# Patient Record
Sex: MALE | Race: OTHER | HISPANIC OR LATINO | Employment: UNEMPLOYED | URBAN - METROPOLITAN AREA
[De-identification: names, ages, dates, MRNs, and addresses within clinical notes are randomized per-mention and may not be internally consistent; named-entity substitution may affect disease eponyms.]

---

## 2022-11-07 ENCOUNTER — HOSPITAL ENCOUNTER (EMERGENCY)
Facility: HOSPITAL | Age: 32
Discharge: HOME | End: 2022-11-07
Attending: EMERGENCY MEDICINE

## 2022-11-07 VITALS
SYSTOLIC BLOOD PRESSURE: 137 MMHG | RESPIRATION RATE: 18 BRPM | HEART RATE: 104 BPM | DIASTOLIC BLOOD PRESSURE: 85 MMHG | WEIGHT: 182.32 LBS | TEMPERATURE: 98.1 F | OXYGEN SATURATION: 99 %

## 2022-11-07 DIAGNOSIS — Z76.0 MEDICATION REFILL: Primary | ICD-10-CM

## 2022-11-07 RX ORDER — BENZTROPINE MESYLATE 0.5 MG/1
0.5 TABLET ORAL 2 TIMES DAILY
Qty: 30 TABLET | Refills: 0 | Status: SHIPPED | OUTPATIENT
Start: 2022-11-07

## 2022-11-07 RX ORDER — QUETIAPINE FUMARATE 100 MG/1
100 TABLET, FILM COATED ORAL
Qty: 30 TABLET | Refills: 0 | Status: SHIPPED | OUTPATIENT
Start: 2022-11-07

## 2022-11-07 RX ORDER — PALIPERIDONE 9 MG/1
9 TABLET, EXTENDED RELEASE ORAL EVERY MORNING
Qty: 30 TABLET | Refills: 0 | Status: SHIPPED | OUTPATIENT
Start: 2022-11-07

## 2022-11-07 NOTE — ED PROVIDER NOTES
History  Chief Complaint   Patient presents with   • Medication Refill     Patient reports that he has not taken his psychiatric medications in 2 days for his schizophrenia (seroquel, cogentin, and 1 other medication)  Denies SI/HI and /  Reports he is waiting for his insurance to kick in       59-year-old male with a history of schizophrenia ran out of his meds 2 days ago, including Seroquel 100 milligrams q h s , Cogentin 0 5 milligrams daily and invega 9mg daily  He just moved to this area from [place] and is waiting for his insurance to go through in a few days  He has to change psychiatrist to this area  No SI/HI          None       Past Medical History:   Diagnosis Date   • Psychiatric disorder        History reviewed  No pertinent surgical history  History reviewed  No pertinent family history  I have reviewed and agree with the history as documented  E-Cigarette/Vaping     E-Cigarette/Vaping Substances     Social History     Tobacco Use   • Smoking status: Never Smoker   • Smokeless tobacco: Never Used   Substance Use Topics   • Alcohol use: Not Currently   • Drug use: Not Currently       Review of Systems   Constitutional: Negative for fever  Respiratory: Negative for shortness of breath  Cardiovascular: Negative for chest pain  Gastrointestinal: Negative for abdominal pain  Neurological: Negative for weakness  Psychiatric/Behavioral:        Mood normal, no SI/HI       Physical Exam  Physical Exam  Constitutional:       Appearance: He is well-developed  HENT:      Head: Normocephalic and atraumatic  Pulmonary:      Effort: Pulmonary effort is normal  No respiratory distress  Musculoskeletal:         General: Normal range of motion  Cervical back: Normal range of motion  Skin:     General: Skin is warm and dry  Neurological:      Mental Status: He is alert     Psychiatric:         Mood and Affect: Mood normal          Behavior: Behavior normal          Thought Content: Thought content normal          Judgment: Judgment normal       Comments: No SI/HI         Vital Signs  ED Triage Vitals   Temperature Pulse Respirations Blood Pressure SpO2   11/07/22 1611 11/07/22 1611 11/07/22 1611 11/07/22 1613 11/07/22 1611   98 1 °F (36 7 °C) 104 18 137/85 99 %      Temp Source Heart Rate Source Patient Position - Orthostatic VS BP Location FiO2 (%)   11/07/22 1611 11/07/22 1611 11/07/22 1611 11/07/22 1611 --   Oral Monitor Sitting Right arm       Pain Score       11/07/22 1611       No Pain           Vitals:    11/07/22 1611 11/07/22 1613   BP:  137/85   Pulse: 104    Patient Position - Orthostatic VS: Sitting          Visual Acuity      ED Medications  Medications - No data to display    Diagnostic Studies  Results Reviewed     None                 No orders to display              Procedures  Procedures         ED Course                                             MDM    Disposition  Final diagnoses:   Medication refill     Time reflects when diagnosis was documented in both MDM as applicable and the Disposition within this note     Time User Action Codes Description Comment    11/7/2022  4:53 PM Lashell Tobias Add [Z76 0] Medication refill       ED Disposition     ED Disposition   Discharge    Condition   Stable    Date/Time   Mon Nov 7, 2022  4:53 PM    [facility] discharge to home/self care                 Follow-up Information     Follow up With Specialties Details Why Contact Info Additional [address]   [address], [address] [facility] [address]  [address], [address], [address] [place], [place], [address] [facility] [place] [facility]   [address] [address]  [place] [address] [address], [facility], [facility], [place], [address], [phone]          Discharge Medication List as of 11/7/2022  5:00 PM      START taking these medications    Details   benztropine (COGENTIN) 0 5 mg tablet Take 1 tablet (0 5 mg total) by mouth 2 (two) times a day, Starting Mon 11/7/2022, Normal      paliperidone (INVEGA) 9 MG 24 hr tablet Take 1 tablet (9 mg total) by mouth every morning, Starting Mon 11/7/2022, Normal      QUEtiapine (SEROquel) 100 mg tablet Take 1 tablet (100 mg total) by mouth daily at bedtime, Starting Mon 11/7/2022, Normal             No discharge procedures on file      PDMP Review     None          ED Provider  Electronically Signed by           Whitley Smith MD  11/07/22 3942

## 2022-12-09 ENCOUNTER — HOSPITAL ENCOUNTER (EMERGENCY)
Facility: HOSPITAL | Age: 32
Discharge: HOME | End: 2022-12-09
Attending: EMERGENCY MEDICINE

## 2022-12-09 VITALS
HEART RATE: 107 BPM | OXYGEN SATURATION: 99 % | SYSTOLIC BLOOD PRESSURE: 146 MMHG | TEMPERATURE: 98.5 F | WEIGHT: 185.41 LBS | RESPIRATION RATE: 20 BRPM | DIASTOLIC BLOOD PRESSURE: 87 MMHG

## 2022-12-09 DIAGNOSIS — Z76.0 ENCOUNTER FOR MEDICATION REFILL: Primary | ICD-10-CM

## 2022-12-09 DIAGNOSIS — F20.9 SCHIZOPHRENIA (HCC): ICD-10-CM

## 2022-12-09 RX ORDER — BENZTROPINE MESYLATE 0.5 MG/1
0.5 TABLET ORAL 2 TIMES DAILY
Qty: 60 TABLET | Refills: 0 | Status: SHIPPED | OUTPATIENT
Start: 2022-12-09

## 2022-12-09 RX ORDER — PALIPERIDONE 9 MG/1
9 TABLET, EXTENDED RELEASE ORAL EVERY MORNING
Qty: 30 TABLET | Refills: 0 | Status: SHIPPED | OUTPATIENT
Start: 2022-12-09

## 2022-12-09 RX ORDER — QUETIAPINE FUMARATE 100 MG/1
100 TABLET, FILM COATED ORAL
Qty: 30 TABLET | Refills: 0 | Status: SHIPPED | OUTPATIENT
Start: 2022-12-09

## 2022-12-09 NOTE — ED PROVIDER NOTES
History  Chief Complaint   Patient presents with   • Vomiting     Vomiting since wednesday  Pt states he ran out of his psych medication and need refills  Pt states he does not have a PCP because of insurance of this time so he is unable to get refills  Pt states when he does not take his medications he is constantly vomiting and unable to eat and/or drink      33y M w/ schizophrenia, here for medication refill  Moved to [facility] from [place] a couple of months ago and still doesn't have a psychiatrist and is out of meds  When he's out of meds, he has problems w/ n/v and states he needs his meds so the bad thoughts don't come back  Denies any current vis/aud hallucinations, no si/hi  No other co      History provided by:  Patient   used: No    Medication Refill  Medications/supplies requested:  Psychiatric meds  Reason for request:  Medications ran out  Medications taken before: yes - see home medications    Patient has complete original prescription information: yes        Prior to Admission Medications   Prescriptions Last Dose Informant Patient Reported? Taking? QUEtiapine (SEROquel) 100 mg tablet   No No   Sig: Take 1 tablet (100 mg total) by mouth daily at bedtime   benztropine (COGENTIN) 0 5 mg tablet   No No   Sig: Take 1 tablet (0 5 mg total) by mouth 2 (two) times a day   paliperidone (INVEGA) 9 MG 24 hr tablet   No No   Sig: Take 1 tablet (9 mg total) by mouth every morning      Facility-Administered Medications: None       Past Medical History:   Diagnosis Date   • Psychiatric disorder        History reviewed  No pertinent surgical history  History reviewed  No pertinent family history  I have reviewed and agree with the history as documented      E-Cigarette/Vaping     E-Cigarette/Vaping Substances     Social History     Tobacco Use   • Smoking status: Never   • Smokeless tobacco: Never   Substance Use Topics   • Alcohol use: Not Currently   • Drug use: Not Currently       Review of Systems   Psychiatric/Behavioral: Negative for hallucinations and suicidal ideas  All other systems reviewed and are negative  Physical Exam  Physical Exam  Vitals and nursing note reviewed  Constitutional:       Appearance: Normal appearance  HENT:      Nose: Nose normal       Mouth/Throat:      Mouth: Mucous membranes are moist    Eyes:      Conjunctiva/sclera: Conjunctivae normal    Cardiovascular:      Rate and Rhythm: Tachycardia present  Pulmonary:      Effort: Pulmonary effort is normal    Abdominal:      General: Abdomen is flat  Musculoskeletal:         General: No deformity  Cervical back: Normal range of motion  Skin:     General: Skin is warm  Neurological:      General: No focal deficit present  Mental Status: He is alert and oriented to person, place, and time  Psychiatric:         Mood and Affect: Mood normal          Behavior: Behavior normal          Thought Content:  Thought content normal          Vital Signs  ED Triage Vitals   Temperature Pulse Respirations Blood Pressure SpO2   12/09/22 1121 12/09/22 1121 12/09/22 1127 12/09/22 1121 12/09/22 1121   98 5 °F (36 9 °C) (!) 107 20 146/87 99 %      Temp Source Heart Rate Source Patient Position - Orthostatic VS BP Location FiO2 (%)   12/09/22 1121 12/09/22 1121 12/09/22 1121 12/09/22 1121 --   Oral Monitor Sitting Right arm       Pain Score       --                  Vitals:    12/09/22 1121   BP: 146/87   Pulse: (!) 107   Patient Position - Orthostatic VS: Sitting         Visual Acuity      ED Medications  Medications - No data to display    Diagnostic Studies  Results Reviewed     None                 No orders to display              Procedures  Procedures         ED Course                                             MDM    Disposition  Final diagnoses:   Encounter for medication refill   [facility]     Time reflects when diagnosis was documented in both MDM as applicable and the Disposition within this note Time User Action Codes Description Comment    12/9/2022 12:30 PM Carlo NEIL Add [Z76 0] Encounter for medication refill     12/9/2022 12:30 PM Cesilia Gray Add [F20 9] Schizophrenia [facility])       ED Disposition     ED Disposition   Discharge    Condition   Stable    Date/Time   Fri Dec 9, 2022 12:35 PM    [address] [facility] discharge to home/self care  Follow-up Information     Follow up With Specialties Details Why Contact Info     [employer] [facility]   Call or go there today to establish care / [employer]  [address]          Discharge Medication List as of 12/9/2022 12:36 PM      START taking these medications    Details   !! benztropine (COGENTIN) 0 5 mg tablet Take 1 tablet (0 5 mg total) by mouth 2 (two) times a day, Starting Fri 12/9/2022, Normal      !! paliperidone (INVEGA) 9 MG 24 hr tablet Take 1 tablet (9 mg total) by mouth every morning, Starting Fri 12/9/2022, Normal      !! QUEtiapine (SEROquel) 100 mg tablet Take 1 tablet (100 mg total) by mouth daily at bedtime, Starting Fri 12/9/2022, Normal       !! - Potential duplicate medications found  Please discuss with provider  CONTINUE these medications which have NOT CHANGED    Details   !! benztropine (COGENTIN) 0 5 mg tablet Take 1 tablet (0 5 mg total) by mouth 2 (two) times a day, Starting Mon 11/7/2022, Normal      !! paliperidone (INVEGA) 9 MG 24 hr tablet Take 1 tablet (9 mg total) by mouth every morning, Starting Mon 11/7/2022, Normal      !! QUEtiapine (SEROquel) 100 mg tablet Take 1 tablet (100 mg total) by mouth daily at bedtime, Starting Mon 11/7/2022, Normal       !! - Potential duplicate medications found  Please discuss with provider  No discharge procedures on file      PDMP Review     None          ED Provider  Electronically Signed by           Preston Rodriguez DO  12/10/22 9497

## 2022-12-09 NOTE — ED NOTES
Pt seen assessed and D/C by provider     Lauryn Doyle RN  12/09/22 5968 [Initial Evaluation] : an initial evaluation of [FreeTextEntry3] : asthma evaluation

## 2022-12-23 ENCOUNTER — TELEPHONE (OUTPATIENT)
Dept: OTHER | Facility: OTHER | Age: 32
End: 2022-12-23

## 2023-01-18 ENCOUNTER — OFFICE VISIT (OUTPATIENT)
Dept: FAMILY MEDICINE CLINIC | Facility: CLINIC | Age: 33
End: 2023-01-18

## 2023-01-18 ENCOUNTER — APPOINTMENT (OUTPATIENT)
Dept: LAB | Facility: HOSPITAL | Age: 33
End: 2023-01-18

## 2023-01-18 VITALS
SYSTOLIC BLOOD PRESSURE: 118 MMHG | DIASTOLIC BLOOD PRESSURE: 80 MMHG | WEIGHT: 182 LBS | HEIGHT: 65 IN | OXYGEN SATURATION: 97 % | RESPIRATION RATE: 16 BRPM | HEART RATE: 100 BPM | BODY MASS INDEX: 30.32 KG/M2

## 2023-01-18 DIAGNOSIS — Z00.01 ENCOUNTER FOR GENERAL ADULT MEDICAL EXAMINATION WITH ABNORMAL FINDINGS: Primary | ICD-10-CM

## 2023-01-18 DIAGNOSIS — Z11.4 SCREENING FOR HUMAN IMMUNODEFICIENCY VIRUS: ICD-10-CM

## 2023-01-18 DIAGNOSIS — R73.03 PRE-DIABETES: ICD-10-CM

## 2023-01-18 DIAGNOSIS — Z11.59 NEED FOR HEPATITIS C SCREENING TEST: ICD-10-CM

## 2023-01-18 DIAGNOSIS — Z11.1 SCREENING FOR TUBERCULOSIS: ICD-10-CM

## 2023-01-18 DIAGNOSIS — Z76.0 MEDICATION REFILL: ICD-10-CM

## 2023-01-18 DIAGNOSIS — Z23 NEEDS FLU SHOT: ICD-10-CM

## 2023-01-18 DIAGNOSIS — I10 PRIMARY HYPERTENSION: ICD-10-CM

## 2023-01-18 DIAGNOSIS — F20.9 SCHIZOPHRENIA (HCC): ICD-10-CM

## 2023-01-18 DIAGNOSIS — Z86.11 HISTORY OF TUBERCULOSIS: ICD-10-CM

## 2023-01-18 LAB
ALBUMIN SERPL BCP-MCNC: 4.6 G/DL (ref 3.5–5)
ALP SERPL-CCNC: 92 U/L (ref 43–122)
ALT SERPL W P-5'-P-CCNC: 38 U/L
ANION GAP SERPL CALCULATED.3IONS-SCNC: 10 MMOL/L (ref 5–14)
AST SERPL W P-5'-P-CCNC: 37 U/L (ref 17–59)
BASOPHILS # BLD AUTO: 0.03 THOUSANDS/ÂΜL (ref 0–0.1)
BASOPHILS NFR BLD AUTO: 0 % (ref 0–1)
BILIRUB SERPL-MCNC: 0.84 MG/DL (ref 0.2–1)
BUN SERPL-MCNC: 15 MG/DL (ref 5–25)
CALCIUM SERPL-MCNC: 9.7 MG/DL (ref 8.4–10.2)
CHLORIDE SERPL-SCNC: 106 MMOL/L (ref 96–108)
CHOLEST SERPL-MCNC: 196 MG/DL
CO2 SERPL-SCNC: 26 MMOL/L (ref 21–32)
CREAT SERPL-MCNC: 0.76 MG/DL (ref 0.7–1.5)
EOSINOPHIL # BLD AUTO: 0.05 THOUSAND/ÂΜL (ref 0–0.61)
EOSINOPHIL NFR BLD AUTO: 1 % (ref 0–6)
ERYTHROCYTE [DISTWIDTH] IN BLOOD BY AUTOMATED COUNT: 11.8 % (ref 11.6–15.1)
EST. AVERAGE GLUCOSE BLD GHB EST-MCNC: 105 MG/DL
GFR SERPL CREATININE-BSD FRML MDRD: 120 ML/MIN/1.73SQ M
GLUCOSE P FAST SERPL-MCNC: 92 MG/DL (ref 70–99)
HBA1C MFR BLD: 5.3 %
HCT VFR BLD AUTO: 46.5 % (ref 36.5–49.3)
HCV AB SER QL: NORMAL
HDLC SERPL-MCNC: 27 MG/DL
HGB BLD-MCNC: 15.4 G/DL (ref 12–17)
IMM GRANULOCYTES # BLD AUTO: 0.02 THOUSAND/UL (ref 0–0.2)
IMM GRANULOCYTES NFR BLD AUTO: 0 % (ref 0–2)
LDLC SERPL CALC-MCNC: 105 MG/DL
LYMPHOCYTES # BLD AUTO: 2.73 THOUSANDS/ÂΜL (ref 0.6–4.47)
LYMPHOCYTES NFR BLD AUTO: 30 % (ref 14–44)
MCH RBC QN AUTO: 28.6 PG (ref 26.8–34.3)
MCHC RBC AUTO-ENTMCNC: 33.1 G/DL (ref 31.4–37.4)
MCV RBC AUTO: 86 FL (ref 82–98)
MONOCYTES # BLD AUTO: 0.57 THOUSAND/ÂΜL (ref 0.17–1.22)
MONOCYTES NFR BLD AUTO: 6 % (ref 4–12)
NEUTROPHILS # BLD AUTO: 5.79 THOUSANDS/ÂΜL (ref 1.85–7.62)
NEUTS SEG NFR BLD AUTO: 63 % (ref 43–75)
NONHDLC SERPL-MCNC: 169 MG/DL
NRBC BLD AUTO-RTO: 0 /100 WBCS
PLATELET # BLD AUTO: 287 THOUSANDS/UL (ref 149–390)
PMV BLD AUTO: 10.9 FL (ref 8.9–12.7)
POTASSIUM SERPL-SCNC: 4.2 MMOL/L (ref 3.5–5.3)
PROT SERPL-MCNC: 8.8 G/DL (ref 6.4–8.4)
RBC # BLD AUTO: 5.38 MILLION/UL (ref 3.88–5.62)
SODIUM SERPL-SCNC: 142 MMOL/L (ref 135–147)
TRIGL SERPL-MCNC: 322 MG/DL
WBC # BLD AUTO: 9.19 THOUSAND/UL (ref 4.31–10.16)

## 2023-01-18 RX ORDER — BENZTROPINE MESYLATE 0.5 MG/1
0.5 TABLET ORAL DAILY
Qty: 30 TABLET | Refills: 5 | Status: SHIPPED | OUTPATIENT
Start: 2023-01-18

## 2023-01-18 RX ORDER — PALIPERIDONE 9 MG/1
9 TABLET, EXTENDED RELEASE ORAL EVERY MORNING
Qty: 30 TABLET | Refills: 5 | Status: SHIPPED | OUTPATIENT
Start: 2023-01-18 | End: 2023-01-23 | Stop reason: SDUPTHER

## 2023-01-18 RX ORDER — QUETIAPINE FUMARATE 100 MG/1
100 TABLET, FILM COATED ORAL
Qty: 30 TABLET | Refills: 5 | Status: SHIPPED | OUTPATIENT
Start: 2023-01-18

## 2023-01-18 NOTE — PROGRESS NOTES
Name: Jasiel Craft      : 1990      MRN: [id number]  Encounter Provider: Shalom Hussein MD  Encounter Date: 2023   Encounter department: [facility]     1  Encounter for general adult medical examination with abnormal findings  -     CBC and differential; Future  -     Comprehensive metabolic panel; Future  -     Lipid panel; Future  -     CBC and differential  -     Comprehensive metabolic panel  -     Lipid panel    2  Needs flu shot  -     influenza vaccine, quadrivalent, 0 5 mL, preservative-free, for adult and pediatric patients 6 mos+ (AFLURIA, FLUARIX, FLULAVAL, FLUZONE)    3  BMI 30 0-30 9,adult    4  Primary hypertension    5  Screening for human immunodeficiency virus  -     : HIV 1/2 AB/AG w Reflex [facility] for 2 yr old and above; Future    6  Pre-diabetes  -     HEMOGLOBIN A1C W/ EAG ESTIMATION; Future    7  Need for hepatitis C screening test  -     Hepatitis C antibody; Future           Subjective      Chief Complaint   Patient presents with   • establish care   • Physical Exam   • Schizophrenia       Patient is here for PE, not having any acute distress  Review of Systems   Constitutional: Negative for diaphoresis, fatigue, fever and unexpected weight change  Respiratory: Negative for apnea, cough, choking, chest tightness and shortness of breath  Cardiovascular: Negative for chest pain, palpitations and leg swelling  Gastrointestinal: Negative for abdominal distention, abdominal pain, anal bleeding, blood in stool and constipation  Musculoskeletal: Negative for arthralgias, back pain, gait problem and joint swelling  Neurological: Negative for dizziness, facial asymmetry, light-headedness and headaches  Psychiatric/Behavioral: Negative for behavioral problems, dysphoric mood and self-injury  The patient is not nervous/anxious          Current Outpatient Medications on File Prior to Visit Medication Sig   • benztropine (COGENTIN) 0 5 mg tablet Take 1 tablet (0 5 mg total) by mouth 2 (two) times a day   • benztropine (COGENTIN) 0 5 mg tablet Take 1 tablet (0 5 mg total) by mouth 2 (two) times a day   • paliperidone (INVEGA) 9 MG 24 hr tablet Take 1 tablet (9 mg total) by mouth every morning   • paliperidone (INVEGA) 9 MG 24 hr tablet Take 1 tablet (9 mg total) by mouth every morning   • QUEtiapine (SEROquel) 100 mg tablet Take 1 tablet (100 mg total) by mouth daily at bedtime   • QUEtiapine (SEROquel) 100 mg tablet Take 1 tablet (100 mg total) by mouth daily at bedtime       Objective     /80 (BP Location: Left arm, Patient Position: Sitting, Cuff Size: Standard)   Pulse 100   Resp 16   Ht 5' 5" (1 651 m)   Wt 82 6 kg (182 lb)   SpO2 97%   BMI 30 29 kg/m²     Physical Exam  Vitals and nursing note reviewed  Neck:      Thyroid: No thyroid mass or thyromegaly  Vascular: No carotid bruit or JVD  Trachea: No tracheal tenderness  Cardiovascular:      Rate and Rhythm: Normal rate and regular rhythm  No extrasystoles are present  Pulses: Normal pulses  Heart sounds: Normal heart sounds  Heart sounds not distant  No friction rub  Pulmonary:      Effort: Pulmonary effort is normal  No tachypnea or bradypnea  Breath sounds: Normal breath sounds  No stridor  Abdominal:      General: Bowel sounds are normal  There is no abdominal bruit  Palpations: Abdomen is soft  There is no hepatomegaly or splenomegaly  Hernia: No hernia is present  Musculoskeletal:         General: Normal range of motion  Cervical back: No edema or rigidity  Skin:     General: Skin is warm and dry  Neurological:      Mental Status: He is oriented to person, place, and time  Deep Tendon Reflexes: Reflexes are normal and symmetric  Psychiatric:         Behavior: Behavior normal          Thought Content:  Thought content normal          Judgment: Judgment normal  Shalom Hussein MD  BMI Counseling: Body mass index is 30 29 kg/m²  The BMI is above normal  Nutrition recommendations include moderation in carbohydrate intake and increasing intake of lean protein  Exercise recommendations include exercising 3-5 times per week

## 2023-01-19 LAB
HIV 1+2 AB+HIV1 P24 AG SERPL QL IA: NORMAL
HIV 2 AB SERPL QL IA: NORMAL
HIV1 AB SERPL QL IA: NORMAL
HIV1 P24 AG SERPL QL IA: NORMAL

## 2023-01-20 ENCOUNTER — TELEPHONE (OUTPATIENT)
Dept: OTHER | Facility: OTHER | Age: 33
End: 2023-01-20

## 2023-01-20 LAB
GAMMA INTERFERON BACKGROUND BLD IA-ACNC: 0.8 IU/ML
M TB IFN-G BLD-IMP: POSITIVE
M TB IFN-G CD4+ BCKGRND COR BLD-ACNC: >10 IU/ML
M TB IFN-G CD4+ BCKGRND COR BLD-ACNC: >10 IU/ML
MITOGEN IGNF BCKGRD COR BLD-ACNC: 9.53 IU/ML

## 2023-01-20 NOTE — TELEPHONE ENCOUNTER
Patient called to inform that his medication, INVEGA, needs prior auth    Fax # to start prior-auth is: [phone]

## 2023-01-23 DIAGNOSIS — R76.12 POSITIVE QUANTIFERON-TB GOLD TEST: Primary | ICD-10-CM

## 2023-01-23 DIAGNOSIS — Z86.11 HISTORY OF TUBERCULOSIS: ICD-10-CM

## 2023-01-23 DIAGNOSIS — F20.9 SCHIZOPHRENIA, UNSPECIFIED TYPE (HCC): Primary | ICD-10-CM

## 2023-01-23 DIAGNOSIS — Z76.0 MEDICATION REFILL: ICD-10-CM

## 2023-01-23 RX ORDER — PALIPERIDONE 9 MG/1
9 TABLET, EXTENDED RELEASE ORAL EVERY MORNING
Qty: 30 TABLET | Refills: 5 | Status: SHIPPED | OUTPATIENT
Start: 2023-01-23

## 2023-01-25 ENCOUNTER — HOSPITAL ENCOUNTER (OUTPATIENT)
Dept: RADIOLOGY | Facility: HOSPITAL | Age: 33
Discharge: HOME | End: 2023-01-25

## 2023-01-25 DIAGNOSIS — Z86.11 HISTORY OF TUBERCULOSIS: ICD-10-CM

## 2023-01-25 DIAGNOSIS — R76.12 POSITIVE QUANTIFERON-TB GOLD TEST: ICD-10-CM

## 2023-03-07 DIAGNOSIS — F20.9 SCHIZOPHRENIA, UNSPECIFIED TYPE (HCC): ICD-10-CM

## 2023-03-07 DIAGNOSIS — Z76.0 MEDICATION REFILL: ICD-10-CM

## 2023-03-07 DIAGNOSIS — F20.9 SCHIZOPHRENIA (HCC): ICD-10-CM

## 2023-03-07 RX ORDER — PALIPERIDONE 9 MG/1
9 TABLET, EXTENDED RELEASE ORAL EVERY MORNING
Qty: 30 TABLET | Refills: 0 | Status: SHIPPED | OUTPATIENT
Start: 2023-03-07

## 2023-03-07 RX ORDER — BENZTROPINE MESYLATE 0.5 MG/1
0.5 TABLET ORAL DAILY
Qty: 30 TABLET | Refills: 0 | Status: SHIPPED | OUTPATIENT
Start: 2023-03-07

## 2023-03-07 RX ORDER — QUETIAPINE FUMARATE 100 MG/1
100 TABLET, FILM COATED ORAL
Qty: 30 TABLET | Refills: 0 | Status: SHIPPED | OUTPATIENT
Start: 2023-03-07

## 2023-03-20 ENCOUNTER — TELEPHONE (OUTPATIENT)
Dept: PSYCHIATRY | Facility: CLINIC | Age: 33
End: 2023-03-20

## 2023-03-20 NOTE — TELEPHONE ENCOUNTER
Contacted patient using interpretive services (ariadne [id number]) in regards to Routine Referral in attempts to verify patient's needs of services and add patient to proper wait list  LVM for patient to contact intake dept  in regards to referral

## 2023-04-13 PROBLEM — H54.40 BLINDNESS OF LEFT EYE WITH NORMAL VISION IN CONTRALATERAL EYE: Status: ACTIVE | Noted: 2023-04-13

## 2023-04-13 PROBLEM — M25.572 ACUTE BILATERAL ANKLE PAIN: Status: ACTIVE | Noted: 2023-04-13

## 2023-04-13 PROBLEM — R63.5 WEIGHT GAIN: Status: ACTIVE | Noted: 2023-04-13

## 2023-04-13 PROBLEM — Z23 NEED FOR TDAP VACCINATION: Status: ACTIVE | Noted: 2023-04-13

## 2023-04-13 PROBLEM — F20.1 DISORGANIZED SCHIZOPHRENIA (HCC): Status: ACTIVE | Noted: 2023-04-13

## 2023-04-13 PROBLEM — Z76.0 MEDICATION REFILL: Status: ACTIVE | Noted: 2023-04-13

## 2023-04-13 PROBLEM — M25.571 ACUTE BILATERAL ANKLE PAIN: Status: ACTIVE | Noted: 2023-04-13

## 2023-04-13 PROBLEM — J30.1 SEASONAL ALLERGIC RHINITIS DUE TO POLLEN: Status: ACTIVE | Noted: 2023-04-13

## 2023-07-20 ENCOUNTER — OFFICE VISIT (OUTPATIENT)
Dept: FAMILY MEDICINE CLINIC | Facility: CLINIC | Age: 33
End: 2023-07-20
Payer: COMMERCIAL

## 2023-07-20 VITALS
HEART RATE: 80 BPM | WEIGHT: 160 LBS | HEIGHT: 65 IN | OXYGEN SATURATION: 98 % | DIASTOLIC BLOOD PRESSURE: 70 MMHG | TEMPERATURE: 97.9 F | RESPIRATION RATE: 16 BRPM | BODY MASS INDEX: 26.66 KG/M2 | SYSTOLIC BLOOD PRESSURE: 120 MMHG

## 2023-07-20 DIAGNOSIS — Z22.7 TB LUNG, LATENT: ICD-10-CM

## 2023-07-20 DIAGNOSIS — R63.5 WEIGHT GAIN: Primary | ICD-10-CM

## 2023-07-20 PROBLEM — M25.572 ACUTE BILATERAL ANKLE PAIN: Status: RESOLVED | Noted: 2023-04-13 | Resolved: 2023-07-20

## 2023-07-20 PROBLEM — M25.571 ACUTE BILATERAL ANKLE PAIN: Status: RESOLVED | Noted: 2023-04-13 | Resolved: 2023-07-20

## 2023-07-20 PROBLEM — Z76.0 MEDICATION REFILL: Status: RESOLVED | Noted: 2023-04-13 | Resolved: 2023-07-20

## 2023-07-20 PROBLEM — Z23 NEED FOR TDAP VACCINATION: Status: RESOLVED | Noted: 2023-04-13 | Resolved: 2023-07-20

## 2023-07-20 PROCEDURE — 99214 OFFICE O/P EST MOD 30 MIN: CPT | Performed by: FAMILY MEDICINE

## 2023-07-20 NOTE — PROGRESS NOTES
Name: Marjorie Hill      : 1990      MRN: [id number]  Encounter Provider: Renya Farrell MD  Encounter Date: 2023   Encounter department: [address] [place] [address]     1. Weight gain  Comments:  goal reached. Continue same. Checking labs. 2. TB lung, latent Treated   Assessment & Plan:  No signs of active TB. prophylactic treatment in            Subjective      HPI   Weight control. Lost 20 lbs in 3 months. Taking Wellbutrin. No side effects. Review of Systems    Current Outpatient Medications on File Prior to Visit   Medication Sig   • benztropine (COGENTIN) 0.5 mg tablet Take 1 tablet (0.5 mg total) by mouth daily   • buPROPion (WELLBUTRIN XL) 150 mg 24 hr tablet Take 1 tablet (150 mg total) by mouth every morning   • fluticasone (FLONASE) 50 mcg/act nasal spray 1 spray into each nostril daily   • paliperidone (INVEGA) 9 MG 24 hr tablet Take 1 tablet (9 mg total) by mouth every morning   • QUEtiapine (SEROquel) 100 mg tablet Take 1 tablet (100 mg total) by mouth daily at bedtime       Objective     /70 (BP Location: Left arm, Patient Position: Sitting, Cuff Size: Standard)   Pulse 80   Temp 97.9 °F (36.6 °C) (Tympanic)   Resp 16   Ht 5' 5" (1.651 m)   Wt 72.6 kg (160 lb)   SpO2 98%   BMI 26.63 kg/m²     Physical Exam  Vitals and nursing note reviewed. Neck:      Thyroid: No thyroid mass or thyromegaly. Vascular: No carotid bruit or JVD. Trachea: No tracheal tenderness. Cardiovascular:      Rate and Rhythm: Normal rate and regular rhythm. No extrasystoles are present. Pulses: Normal pulses. Heart sounds: Normal heart sounds. Heart sounds not distant. No friction rub. Pulmonary:      Effort: Pulmonary effort is normal. No tachypnea or bradypnea. Breath sounds: Normal breath sounds. No stridor. Abdominal:      General: There is no abdominal bruit. Palpations:  There is no hepatomegaly or splenomegaly. Hernia: No hernia is present. Musculoskeletal:         General: Normal range of motion. Cervical back: No edema or rigidity. Skin:     General: Skin is warm. Neurological:      Mental Status: He is oriented to person, place, and time. Deep Tendon Reflexes: Reflexes are normal and symmetric.           Deandre Stahl MD